# Patient Record
Sex: MALE | Race: WHITE | NOT HISPANIC OR LATINO | Employment: OTHER | URBAN - NONMETROPOLITAN AREA
[De-identification: names, ages, dates, MRNs, and addresses within clinical notes are randomized per-mention and may not be internally consistent; named-entity substitution may affect disease eponyms.]

---

## 2023-09-13 ENCOUNTER — OFFICE VISIT (OUTPATIENT)
Dept: NEUROSURGERY | Facility: CLINIC | Age: 73
End: 2023-09-13
Payer: MEDICARE

## 2023-09-13 VITALS — WEIGHT: 156.2 LBS | BODY MASS INDEX: 21.87 KG/M2 | HEIGHT: 71 IN

## 2023-09-13 DIAGNOSIS — Z78.9 NONSMOKER: ICD-10-CM

## 2023-09-13 DIAGNOSIS — M54.16 LUMBAR RADICULOPATHY: Primary | ICD-10-CM

## 2023-09-13 DIAGNOSIS — R20.0 LEFT LEG NUMBNESS: ICD-10-CM

## 2023-09-13 PROBLEM — E07.9 THYROID DISEASE: Status: ACTIVE | Noted: 2023-09-13

## 2023-09-13 PROBLEM — H92.09 EAR PAIN: Status: ACTIVE | Noted: 2023-09-13

## 2023-09-13 PROBLEM — H93.19 TINNITUS: Status: ACTIVE | Noted: 2023-09-13

## 2023-09-13 PROCEDURE — 1160F RVW MEDS BY RX/DR IN RCRD: CPT | Performed by: NURSE PRACTITIONER

## 2023-09-13 PROCEDURE — 99204 OFFICE O/P NEW MOD 45 MIN: CPT | Performed by: NURSE PRACTITIONER

## 2023-09-13 PROCEDURE — 1159F MED LIST DOCD IN RCRD: CPT | Performed by: NURSE PRACTITIONER

## 2023-09-13 RX ORDER — LORATADINE 10 MG/1
10 TABLET ORAL DAILY
COMMUNITY

## 2023-09-13 RX ORDER — DICLOFENAC SODIUM 75 MG/1
75 TABLET, DELAYED RELEASE ORAL 2 TIMES DAILY
Qty: 60 TABLET | Refills: 2 | Status: SHIPPED | OUTPATIENT
Start: 2023-09-13

## 2023-09-13 RX ORDER — ESOMEPRAZOLE MAGNESIUM 40 MG/1
40 CAPSULE, DELAYED RELEASE ORAL DAILY
COMMUNITY

## 2023-09-13 RX ORDER — GABAPENTIN 100 MG/1
100 CAPSULE ORAL 3 TIMES DAILY
Qty: 90 CAPSULE | Refills: 2 | Status: SHIPPED | OUTPATIENT
Start: 2023-09-13

## 2023-09-13 RX ORDER — TRAZODONE HYDROCHLORIDE 100 MG/1
1 TABLET ORAL NIGHTLY
COMMUNITY
Start: 2023-08-21

## 2023-09-13 RX ORDER — FLUTICASONE PROPIONATE 50 MCG
2 SPRAY, SUSPENSION (ML) NASAL 2 TIMES DAILY
COMMUNITY

## 2023-09-13 RX ORDER — FOLIC ACID 1 MG/1
1 TABLET ORAL DAILY
COMMUNITY
Start: 2023-07-24

## 2023-09-13 RX ORDER — LEVOTHYROXINE SODIUM 0.1 MG/1
1 TABLET ORAL DAILY
COMMUNITY
Start: 2023-05-30

## 2023-09-13 RX ORDER — FLUOXETINE HYDROCHLORIDE 20 MG/1
1 CAPSULE ORAL DAILY
COMMUNITY
Start: 2023-05-26

## 2023-09-13 NOTE — PROGRESS NOTES
"    Chief complaint:   Chief Complaint   Patient presents with    Back Pain     Pt here for lb and L hip pain with numbness and tingling in bilateral legs and feet. Pt states he has not had any physical therapy, pain mgmt or chiropractor.        Subjective     HPI: This is a 72-year-old male gentleman who was referred to us by Dr. Guanako Guzman for left buttocks pain and left leg numbness and tingling.  He is here to be evaluated today.  The patient says that this started about 2 months ago.  There is no accident or injury associated with this.  Is not complaining of any significant back pain.  His main complaint of pain is in his left buttocks region.  He also does have numbness and tingling that is going down his left leg to his foot.  The pain in his buttocks is intermittent.  It is worse with sitting and better with laying down.  Denies any bowel or bladder incontinence.  He has not done any recent physical therapy or chiropractic care pain management injections.  He is right-hand dominant.  He retired in 2017.  Denies any tobacco, alcohol, or illicit drug use.  He is .    Review of Systems   HENT:  Positive for ear pain and tinnitus.    Neurological:  Positive for numbness.   All other systems reviewed and are negative.     Past Medical History:   Diagnosis Date    Ear pain     Thyroid disease     Tinnitus      History reviewed. No pertinent surgical history.  Family History   Problem Relation Age of Onset    COPD Mother 78    Cancer Father 45     Social History     Tobacco Use    Smoking status: Former     Types: Pipe, Cigars     Quit date:      Years since quittin.7    Smokeless tobacco: Never   Vaping Use    Vaping Use: Never used   Substance Use Topics    Alcohol use: Never    Drug use: Never     (Not in a hospital admission)    Allergies:  Cipro [ciprofloxacin hcl], Sulfa antibiotics, and Sulfasalazine    Objective      Vital Signs  Ht 179.1 cm (70.5\")   Wt 70.9 kg (156 lb 3.2 oz)   BMI " 22.10 kg/m²     Physical Exam  Constitutional:       Appearance: Normal appearance. He is well-developed.   HENT:      Head: Normocephalic.   Eyes:      General: Lids are normal.      Extraocular Movements: EOM normal.      Conjunctiva/sclera: Conjunctivae normal.      Pupils: Pupils are equal, round, and reactive to light.   Pulmonary:      Effort: Pulmonary effort is normal.      Breath sounds: Normal breath sounds.   Musculoskeletal:         General: Normal range of motion.      Cervical back: Normal range of motion.   Skin:     General: Skin is warm.   Neurological:      Mental Status: He is alert and oriented to person, place, and time.      GCS: GCS eye subscore is 4. GCS verbal subscore is 5. GCS motor subscore is 6.      Cranial Nerves: No cranial nerve deficit.      Sensory: No sensory deficit.      Motor: Motor strength is normal.      Gait: Gait is intact.      Deep Tendon Reflexes: Reflexes are normal and symmetric. Reflexes normal.   Psychiatric:         Speech: Speech normal.         Behavior: Behavior normal.         Thought Content: Thought content normal.       Neurologic Exam     Mental Status   Oriented to person, place, and time.   Attention: normal. Concentration: normal.   Speech: speech is normal   Level of consciousness: alert  Normal comprehension.     Cranial Nerves     CN II   Visual fields full to confrontation.     CN III, IV, VI   Pupils are equal, round, and reactive to light.  Extraocular motions are normal.     CN V   Facial sensation intact.     CN VII   Facial expression full, symmetric.     CN VIII   CN VIII normal.     CN IX, X   CN IX normal.   CN X normal.     CN XI   CN XI normal.     CN XII   CN XII normal.     Motor Exam   Muscle bulk: normal    Strength   Strength 5/5 throughout.     Sensory Exam   Light touch normal.     Gait, Coordination, and Reflexes     Gait  Gait: normal    Reflexes   Reflexes 2+ except as noted.     Imaging review: X-rays of the of the lumbar spine  that was done on June 12, 2023 shows mild disc degeneration and mild chronic compression fractures of T12-L2        Assessment/Plan: The patient is complaining of left buttocks pain and numbness in his left leg.  I am going to start him on diclofenac and gabapentin to see if this will help with his symptoms.  For first line conservative care of lumbar pain, I would like to send Mr. Leung for a dedicated course of physician directed physical therapy consisting of 2-3 times a week for 4-6 weeks.    Return for reassessment with me after 6-8 weeks after physical therapy.     Should Mr. Leung not have any improvement from physical therapy, I think it would be prudent to send the patient for an MRI of the lumbar spine to see if there is anything from a surgical standpoint that needs to be addressed.     I advised the patient to call and return sooner for new or worsening complaints of weakness, paresthesias, gait disturbances, or any additional concerns.  Treatment options discussed in detail with Daniel and the patient voiced understanding.  Mr. Leung agrees with this plan of care.     Patient is a nonsmoker  The patient's Body mass index is 22.1 kg/m².. BMI is within normal parameters. No follow-up required.  Advance Care Planning   ACP discussion was held with the patient during this visit. Patient does not have an advance directive, information provided.  STEADI Fall Risk Assessment was completed, and patient is at LOW risk for falls.Assessment completed on:9/13/2023       Diagnoses and all orders for this visit:    1. Lumbar radiculopathy (Primary)  -     Ambulatory Referral to Physical Therapy Evaluate and treat, Neuro; Strengthening, ROM, Stretching; Full weight bearing  -     gabapentin (Neurontin) 100 MG capsule; Take 1 capsule by mouth 3 (Three) Times a Day.  Dispense: 90 capsule; Refill: 2    2. Left leg numbness  -     Ambulatory Referral to Physical Therapy Evaluate and treat, Neuro; Strengthening,  ROM, Stretching; Full weight bearing  -     gabapentin (Neurontin) 100 MG capsule; Take 1 capsule by mouth 3 (Three) Times a Day.  Dispense: 90 capsule; Refill: 2    3. Body mass index (BMI) of 22.0-22.9 in adult    4. Nonsmoker    Other orders  -     diclofenac (VOLTAREN) 75 MG EC tablet; Take 1 tablet by mouth 2 (Two) Times a Day.  Dispense: 60 tablet; Refill: 2          I discussed the patients findings and my recommendations with patient    Yvan Wiseman, PACO  09/13/23  13:46 CDT

## 2023-09-13 NOTE — PATIENT INSTRUCTIONS
Advance Care Planning and Advance Directives     You make decisions on a daily basis - decisions about where you want to live, your career, your home, your life. Perhaps one of the most important decisions you face is your choice for future medical care. Take time to talk with your family and your healthcare team and start planning today.  Advance Care Planning is a process that can help you:  Understand possible future healthcare decisions in light of your own experiences  Reflect on those decision in light of your goals and values  Discuss your decisions with those closest to you and the healthcare professionals that care for you  Make a plan by creating a document that reflects your wishes    Surrogate Decision Maker  In the event of a medical emergency, which has left you unable to communicate or to make your own decisions, you would need someone to make decisions for you.  It is important to discuss your preferences for medical treatment with this person while you are in good health.     Qualities of a surrogate decision maker:  Willing to take on this role and responsibility  Knows what you want for future medical care  Willing to follow your wishes even if they don't agree with them  Able to make difficult medical decisions under stressful circumstances    Advance Directives  These are legal documents you can create that will guide your healthcare team and decision maker(s) when needed. These documents can be stored in the electronic medical record.    Living Will - a legal document to guide your care if you have a terminal condition or a serious illness and are unable to communicate. States vary by statute in document names/types, but most forms may include one or more of the following:        -  Directions regarding life-prolonging treatments        -  Directions regarding artificially provided nutrition/hydration        -  Choosing a healthcare decision maker        -  Direction regarding organ/tissue  donation    Durable Power of  for Healthcare - this document names an -in-fact to make medical decisions for you, but it may also allow this person to make personal and financial decisions for you. Please seek the advice of an  if you need this type of document.    **Advance Directives are not required and no one may discriminate against you if you do not sign one.    Medical Orders  Many states allow specific forms/orders signed by your physician to record your wishes for medical treatment in your current state of health. This form, signed in personal communication with your physician, addresses resuscitation and other medical interventions that you may or may not want.      For more information or to schedule a time with a [facility] Advance Care Planning Facilitator contact: [email or web address] or call [phone] and someone will contact you directly.

## 2023-10-26 ENCOUNTER — OFFICE VISIT (OUTPATIENT)
Dept: NEUROSURGERY | Facility: CLINIC | Age: 73
End: 2023-10-26
Payer: MEDICARE

## 2023-10-26 VITALS — WEIGHT: 157 LBS | HEIGHT: 71 IN | BODY MASS INDEX: 21.98 KG/M2

## 2023-10-26 DIAGNOSIS — R20.0 LEFT LEG NUMBNESS: ICD-10-CM

## 2023-10-26 DIAGNOSIS — M54.16 LUMBAR RADICULOPATHY: Primary | ICD-10-CM

## 2023-10-26 DIAGNOSIS — Z78.9 NONSMOKER: ICD-10-CM

## 2023-10-26 PROCEDURE — 1160F RVW MEDS BY RX/DR IN RCRD: CPT | Performed by: NURSE PRACTITIONER

## 2023-10-26 PROCEDURE — 99213 OFFICE O/P EST LOW 20 MIN: CPT | Performed by: NURSE PRACTITIONER

## 2023-10-26 PROCEDURE — 1159F MED LIST DOCD IN RCRD: CPT | Performed by: NURSE PRACTITIONER

## 2023-10-26 NOTE — PROGRESS NOTES
"    Chief complaint:   Chief Complaint   Patient presents with    Back Pain     Pt states since his last visit his symptoms have not changed.         Subjective     HPI: This is a 72-year-old male gentleman who was referred to us by Dr. Guanako Guzman for left buttocks pain and left leg numbness and tingling.  He is here to be evaluated today.  The patient says that this started about 2 months ago.  There is no accident or injury associated with this.  Is not complaining of any significant back pain.  His main complaint of pain is in his left buttocks region.  He also does have numbness and tingling that is going down his left leg to his foot.  The pain in his buttocks is intermittent.  It is worse with sitting and better with laying down.  Denies any bowel or bladder incontinence.  He has not done any recent chiropractic care pain management injections.  He is right-hand dominant.  He retired in 2017.  Denies any tobacco, alcohol, or illicit drug use.  He is .     We did send the patient for a dedicated course of physical therapy as well as a trial of diclofenac and gabapentin.  The patient says that he was able to go through physical therapy but did not make any improvement from therapy.  He did try the diclofenac with mixed results but not give the gabapentin try at this time.  He continues to complain of pain mostly in his left buttocks that is worse with sitting and better with laying down.  Numbness and tingling does come into the left lower extremity as well      Review of Systems   Musculoskeletal:  Positive for back pain.   Neurological:  Positive for numbness.         Objective      Vital Signs  Ht 179.1 cm (70.5\")   Wt 71.2 kg (157 lb)   BMI 22.21 kg/m²     Physical Exam  Constitutional:       Appearance: Normal appearance. He is well-developed.   HENT:      Head: Normocephalic.   Eyes:      General: Lids are normal.      Extraocular Movements: EOM normal.      Conjunctiva/sclera: Conjunctivae " normal.      Pupils: Pupils are equal, round, and reactive to light.   Pulmonary:      Effort: Pulmonary effort is normal.      Breath sounds: Normal breath sounds.   Musculoskeletal:         General: Normal range of motion.      Cervical back: Normal range of motion.   Skin:     General: Skin is warm.   Neurological:      Mental Status: He is alert and oriented to person, place, and time.      GCS: GCS eye subscore is 4. GCS verbal subscore is 5. GCS motor subscore is 6.      Cranial Nerves: No cranial nerve deficit.      Sensory: No sensory deficit.      Motor: Motor strength is normal.     Gait: Gait is intact.      Deep Tendon Reflexes: Reflexes are normal and symmetric. Reflexes normal.   Psychiatric:         Speech: Speech normal.         Behavior: Behavior normal.         Thought Content: Thought content normal.         Neurologic Exam     Mental Status   Oriented to person, place, and time.   Attention: normal. Concentration: normal.   Speech: speech is normal   Level of consciousness: alert  Normal comprehension.     Cranial Nerves     CN II   Visual fields full to confrontation.     CN III, IV, VI   Pupils are equal, round, and reactive to light.  Extraocular motions are normal.     CN V   Facial sensation intact.     CN VII   Facial expression full, symmetric.     CN VIII   CN VIII normal.     CN IX, X   CN IX normal.   CN X normal.     CN XI   CN XI normal.     CN XII   CN XII normal.     Motor Exam   Muscle bulk: normal    Strength   Strength 5/5 throughout.     Sensory Exam   Light touch normal.     Gait, Coordination, and Reflexes     Gait  Gait: normal    Reflexes   Reflexes 2+ except as noted.       Imaging review: X-rays of the of the lumbar spine that was done on June 12, 2023 shows mild disc degeneration and mild chronic compression fractures of T12-L2         Assessment/Plan: Patient is complaining of left buttocks pain and numbness in his left leg.  We can send him for an MRI of the lumbar  spine.  We will also make referral to pain management to try injections.  I will have an appointment to come back and meet with Dr. Knight for further evaluation and recommendation.    Patient is a nonsmoker  The patient's Body mass index is 22.21 kg/m².. BMI is within normal parameters. No follow-up required.  Advance Care Planning   ACP discussion was held with the patient during this visit. Patient does not have an advance directive, information provided.   STEADI Fall Risk Assessment was completed, and patient is at LOW risk for falls.Assessment completed on:9/13/2023     Diagnoses and all orders for this visit:    1. Lumbar radiculopathy (Primary)  -     MRI Lumbar Spine Without Contrast; Future  -     Ambulatory Referral to Pain Management Clinic    2. Left leg numbness  -     MRI Lumbar Spine Without Contrast; Future  -     Ambulatory Referral to Pain Management Clinic    3. Nonsmoker    4. Body mass index (BMI) of 22.0-22.9 in adult        I discussed the patients findings and my recommendations with patient  Yvan Wiseman, PACO  10/26/23  12:10 CDT

## 2023-10-26 NOTE — PATIENT INSTRUCTIONS
Advance Care Planning and Advance Directives     You make decisions on a daily basis - decisions about where you want to live, your career, your home, your life. Perhaps one of the most important decisions you face is your choice for future medical care. Take time to talk with your family and your healthcare team and start planning today.  Advance Care Planning is a process that can help you:  Understand possible future healthcare decisions in light of your own experiences  Reflect on those decision in light of your goals and values  Discuss your decisions with those closest to you and the healthcare professionals that care for you  Make a plan by creating a document that reflects your wishes    Surrogate Decision Maker  In the event of a medical emergency, which has left you unable to communicate or to make your own decisions, you would need someone to make decisions for you.  It is important to discuss your preferences for medical treatment with this person while you are in good health.     Qualities of a surrogate decision maker:  Willing to take on this role and responsibility  Knows what you want for future medical care  Willing to follow your wishes even if they don't agree with them  Able to make difficult medical decisions under stressful circumstances    Advance Directives  These are legal documents you can create that will guide your healthcare team and decision maker(s) when needed. These documents can be stored in the electronic medical record.    Living Will - a legal document to guide your care if you have a terminal condition or a serious illness and are unable to communicate. States vary by statute in document names/types, but most forms may include one or more of the following:        -  Directions regarding life-prolonging treatments        -  Directions regarding artificially provided nutrition/hydration        -  Choosing a healthcare decision maker        -  Direction regarding organ/tissue  donation    Durable Power of  for Healthcare - this document names an -in-fact to make medical decisions for you, but it may also allow this person to make personal and financial decisions for you. Please seek the advice of an  if you need this type of document.    **Advance Directives are not required and no one may discriminate against you if you do not sign one.    Medical Orders  Many states allow specific forms/orders signed by your physician to record your wishes for medical treatment in your current state of health. This form, signed in personal communication with your physician, addresses resuscitation and other medical interventions that you may or may not want.      For more information or to schedule a time with a [facility] Advance Care Planning Facilitator contact: [email or web address] or call [phone] and someone will contact you directly.

## 2023-11-18 ENCOUNTER — HOSPITAL ENCOUNTER (OUTPATIENT)
Dept: MRI IMAGING | Facility: HOSPITAL | Age: 73
Discharge: HOME | End: 2023-11-18
Admitting: NURSE PRACTITIONER
Payer: MEDICARE

## 2023-11-18 DIAGNOSIS — R20.0 LEFT LEG NUMBNESS: ICD-10-CM

## 2023-11-18 DIAGNOSIS — M54.16 LUMBAR RADICULOPATHY: ICD-10-CM

## 2023-11-18 PROCEDURE — 72148 MRI LUMBAR SPINE W/O DYE: CPT

## 2024-02-20 ENCOUNTER — DOCUMENTATION (OUTPATIENT)
Dept: NEUROSURGERY | Facility: CLINIC | Age: 74
End: 2024-02-20
Payer: MEDICARE

## 2024-02-20 NOTE — PROGRESS NOTES
Requesting copies of patient's pain mgmt notes be faxed to:  ATTN: DIAZ ABBOTT CMA, PHYSICIAN LEAD  [facility]  DR INGRID LION  [phone]    This patient has an appt with Dr Lion on 24 for follow up and I do not have any documentation on his pain mgmt appointment.    Patient: Daniel Leung  : 1950      DIAZ ABBOTT CMA  PHYSICIAN LEAD  DR INGRID LION  [facility]  [phone]

## 2024-02-22 ENCOUNTER — OFFICE VISIT (OUTPATIENT)
Dept: NEUROSURGERY | Facility: CLINIC | Age: 74
End: 2024-02-22
Payer: MEDICARE

## 2024-02-22 VITALS — BODY MASS INDEX: 22.12 KG/M2 | WEIGHT: 158 LBS | HEIGHT: 71 IN

## 2024-02-22 DIAGNOSIS — M54.16 LUMBAR RADICULOPATHY: Primary | ICD-10-CM

## 2024-02-22 PROCEDURE — 1159F MED LIST DOCD IN RCRD: CPT | Performed by: NEUROLOGICAL SURGERY

## 2024-02-22 PROCEDURE — 99213 OFFICE O/P EST LOW 20 MIN: CPT | Performed by: NEUROLOGICAL SURGERY

## 2024-02-22 PROCEDURE — 1160F RVW MEDS BY RX/DR IN RCRD: CPT | Performed by: NEUROLOGICAL SURGERY

## 2024-02-22 NOTE — PROGRESS NOTES
SUBJECTIVE:  Patient ID: Daniel Leung is a 73 y.o. male is here today for follow-up.    Chief Complaint: Left leg pain  Chief Complaint   Patient presents with    PHYSICAL THERAPY FOLLOW UP     Patient did approx 10 visits of therapy @ [facility] but states he didn't see any improvement in his symptoms.  He has an appt with pain mgmt next week.  He states he has N/T in his RT foot, LLE and also in his face.  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  He also complains of some right foot numbness and some numbness and tingling in his hands with certain positions.  He also complains of some left V3 jaw numbness.  He is done a dedicated course of physical therapy for the left lower extremity discomfort without any improvement.  He has been taking anti-inflammatories but has not tried any neuropathic medications.  He is scheduled to follow-up with a neurologist in [place] for these complaints and pain management specialist near his home.    The following portions of the patient's history were reviewed and updated as appropriate: allergies, current medications, past family history, past medical history, past social history, past surgical history and problem list.    OBJECTIVE:    Review of Systems   Neurological:  Positive for numbness.          Physical Exam  Eyes:      Extraocular Movements: EOM normal.      Pupils: Pupils are equal, round, and reactive to light.   Neurological:      Mental Status: He is oriented to person, place, and time.      Motor: Motor strength is normal.     Coordination: Finger-Nose-Finger Test normal.      Gait: Gait is intact.   Psychiatric:         Speech: Speech normal.         Neurologic Exam     Mental Status   Oriented to person, place, and time.   Attention: normal.   Speech: speech is normal   Level of consciousness: alert  Knowledge: good.      Cranial Nerves     CN II   Visual fields full to confrontation.     CN III, IV, VI   Pupils are equal, round, and reactive to light.  Extraocular motions are normal.     CN V   Facial sensation intact.     CN VII   Facial expression full, symmetric.     CN VIII   CN VIII normal.     CN IX, X   CN IX normal.   CN X normal.     CN XI   CN XI normal.     CN XII   CN XII normal.     Motor Exam   Muscle bulk: normal  Overall muscle tone: normal  Right arm pronator drift: absent  Left arm pronator drift: absent    Strength   Strength 5/5 throughout.     Sensory Exam   Light touch normal.   Pinprick normal.     Gait, Coordination, and Reflexes     Gait  Gait: normal    Coordination   Finger to nose coordination: normal    Tremor   Resting tremor: absent  Intention tremor: absent  Action tremor: absent    Reflexes   Reflexes 2+ except as noted.       Independent Review of Radiographic Studies:       ASSESSMENT/PLAN:  MRI shows very mild degenerative changes with no significant neuroforaminal compromise or compression.  EMG nerve conduction study of the lower extremities mentions peroneal neuropathy, radiculopathy and peripheral neuropathy.  He certainly does not require any lumbar spine surgery nor do I think any of the findings in the lumbar spine MRI explain his lower extremity complaints.  His left leg could be due to neuropathy combined with a piriformis syndrome.  We discussed this at length.  We did discuss starting gabapentin again.  Were also going to prescribe him a compounding cream which includes gabapentin.  I would be happy to see him again in the future on an as-needed basis.      No diagnosis found.    The patient's Body mass index is 22.35 kg/m².. BMI is within normal parameters. No follow-up required.    Return if symptoms worsen or fail to improve.      Renzo Knight MD

## 2024-02-22 NOTE — PATIENT INSTRUCTIONS
Clinic Care Coordination Contact  Care Coordination Clinician Chart Review    Situation: Patient chart reviewed by Care Coordinator.       Background: Care Coordination Program started: 10/22/2022. Initial assessment completed and patient-centered care plan(s) were developed with participation from patient. Lead CC handed patient off to CHW for continued outreaches.       Assessment: Per chart review, patient outreach completed by CC CHW on 3/31/23.  Patient is actively working to accomplish goal(s). Patient's goal(s) appropriate and relevant at this time. Patient is not due for updated Plan of Care.  Assessments will be completed annually or as needed/with change of patient status.      Care Plan: In Home Support and Resources     Problem: Lack of caregiver support     Goal: In Home Support and Resources     Start Date: 11/10/2022 Expected End Date: 11/10/2023    This Visit's Progress: 30% Recent Progress: 30%    Note:     Barriers: Lack of caregiver support  Strengths: Patient's wife Alyssa is engaged and motivated   Patient expressed understanding of goal: Yes  Action steps to achieve this goal:  1. I understand that RN CC will mail and send through [employer] in home support resources, caregiver support resources and [place] Cho[place] information for [facility].  2. I will review resources with my children and utilize as we see fit.   3. I will call [employer] directly to learn more about their volunteer respite program.  4. I will continue obtaining PCA assistance for 10 hours a week (Monday's and Friday's)  5. I will call my Senior [facility] ([facility]) directly at [phone] to request more in home services if needed.  4.  I will continue to work with care coordination for any additional resources and support.                              Plan/Recommendations: The patient will continue working with Care Coordination to achieve goal(s) as above. CHW will continue outreaches at minimum every 30 days and will  PATIENT TO CONTINUE TO FOLLOW UP WITH HIS PRIMARY CARE PROVIDER FOR YEARLY PHYSICAL EXAMS TO ENSURE COMPLETE HEALTH MAINTENANCE         involve Lead CC as needed or if patient is ready to move to Maintenance. Lead CC will continue to monitor CHW outreaches and patient's progress to goal(s) every 6 weeks.     Plan of Care updated and sent to patient: Victoria Liz RN Care Coordinator  [facility]  Email: [email or web address]  Phone: [phone]